# Patient Record
Sex: FEMALE | Race: WHITE | NOT HISPANIC OR LATINO | Employment: OTHER | URBAN - METROPOLITAN AREA
[De-identification: names, ages, dates, MRNs, and addresses within clinical notes are randomized per-mention and may not be internally consistent; named-entity substitution may affect disease eponyms.]

---

## 2017-04-21 DIAGNOSIS — I35.0 SEVERE AORTIC STENOSIS: Primary | ICD-10-CM

## 2017-05-18 ENCOUNTER — HOSPITAL ENCOUNTER (OUTPATIENT)
Dept: CARDIOLOGY | Facility: CLINIC | Age: 57
Discharge: HOME | End: 2017-05-18
Payer: MEDICARE

## 2017-05-18 ENCOUNTER — OFFICE VISIT (OUTPATIENT)
Dept: CARDIOTHORACIC SURGERY | Facility: CLINIC | Age: 57
End: 2017-05-18
Payer: MEDICARE

## 2017-05-18 ENCOUNTER — HOSPITAL ENCOUNTER (OUTPATIENT)
Dept: PULMONOLOGY | Facility: CLINIC | Age: 57
Discharge: HOME | End: 2017-05-18
Payer: MEDICARE

## 2017-05-18 VITALS
TEMPERATURE: 98 F | SYSTOLIC BLOOD PRESSURE: 86 MMHG | WEIGHT: 257 LBS | HEART RATE: 93 BPM | OXYGEN SATURATION: 100 % | DIASTOLIC BLOOD PRESSURE: 44 MMHG | BODY MASS INDEX: 42.82 KG/M2 | HEIGHT: 65 IN

## 2017-05-18 DIAGNOSIS — I35.0 SEVERE AORTIC STENOSIS: ICD-10-CM

## 2017-05-18 DIAGNOSIS — J44.9 CHRONIC OBSTRUCTIVE PULMONARY DISEASE, UNSPECIFIED COPD TYPE: Primary | ICD-10-CM

## 2017-05-18 DIAGNOSIS — I35.0 SEVERE AORTIC STENOSIS: Primary | ICD-10-CM

## 2017-05-18 LAB
AORTIC VALVE REGURGITATION: ABNORMAL
AORTIC VALVE STENOSIS: ABNORMAL
ESTIMATED PA SYSTOLIC PRESSURE: 80.93
PRE FEV1 FVC: 77
PRE FEV1: 1.2
PRE FVC: 1.56
PREDICTED FEV1 FVC: 80
PREDICTED FEV1: 2.6
PREDICTED FVC: 3.21
RETIRED EF AND QEF - SEE NOTES: 25 (ref 55–65)
TRICUSPID VALVE REGURGITATION: ABNORMAL

## 2017-05-18 PROCEDURE — 82803 BLOOD GASES ANY COMBINATION: CPT | Mod: S$GLB,,, | Performed by: INTERNAL MEDICINE

## 2017-05-18 PROCEDURE — 99499 UNLISTED E&M SERVICE: CPT | Mod: S$GLB,,, | Performed by: THORACIC SURGERY (CARDIOTHORACIC VASCULAR SURGERY)

## 2017-05-18 PROCEDURE — 94010 BREATHING CAPACITY TEST: CPT | Mod: S$GLB,,, | Performed by: INTERNAL MEDICINE

## 2017-05-18 PROCEDURE — 1160F RVW MEDS BY RX/DR IN RCRD: CPT | Mod: S$GLB,,, | Performed by: THORACIC SURGERY (CARDIOTHORACIC VASCULAR SURGERY)

## 2017-05-18 PROCEDURE — 3078F DIAST BP <80 MM HG: CPT | Mod: S$GLB,,, | Performed by: THORACIC SURGERY (CARDIOTHORACIC VASCULAR SURGERY)

## 2017-05-18 PROCEDURE — 36600 WITHDRAWAL OF ARTERIAL BLOOD: CPT | Mod: 59,S$GLB,, | Performed by: INTERNAL MEDICINE

## 2017-05-18 PROCEDURE — 93306 TTE W/DOPPLER COMPLETE: CPT | Mod: S$GLB,,, | Performed by: INTERNAL MEDICINE

## 2017-05-18 PROCEDURE — 99205 OFFICE O/P NEW HI 60 MIN: CPT | Mod: S$GLB,,, | Performed by: THORACIC SURGERY (CARDIOTHORACIC VASCULAR SURGERY)

## 2017-05-18 PROCEDURE — 99999 PR PBB SHADOW E&M-EST. PATIENT-LVL III: CPT | Mod: PBBFAC,,, | Performed by: THORACIC SURGERY (CARDIOTHORACIC VASCULAR SURGERY)

## 2017-05-18 PROCEDURE — 3074F SYST BP LT 130 MM HG: CPT | Mod: S$GLB,,, | Performed by: THORACIC SURGERY (CARDIOTHORACIC VASCULAR SURGERY)

## 2017-05-18 NOTE — PROGRESS NOTES
"Cardiothoracic Surgery Clinic Note - H and P    Subjective:     Procedures:      Meme aYng is a 56 y.o. female with history of AS, COPD (on home O2), HTN, HLD who presents to clinic for evaluation. She had aortic stenosis s/p prosthetic AVR in . She reports worsening dyspnea, new onset syncopal episodes -- which she states were similar to the "syncope she experienced prior to her first surgery in ." She reports she is no longer able to ambulate.      LHC in Oct '16 showed no significant CAD.     NYHA Class IV    Past Medical History:   Diagnosis Date    Asthma     COPD (chronic obstructive pulmonary disease)     Heart disease     Hypertension     Pneumonia        Past Surgical History:   Procedure Laterality Date    AORTIC VALVE REPLACEMENT  2012    Porcine  [facility]g     SECTION      CHOLECYSTECTOMY      HERNIA REPAIR      HYSTERECTOMY      implanted cardiac monitor  2013    medtronic       Social History     Social History    Marital status:      Spouse name: N/A    Number of children: N/A    Years of education: N/A     Occupational History    Not on file.     Social History Main Topics    Smoking status: Former Smoker     Quit date: 8/15/2013    Smokeless tobacco: Not on file    Alcohol use Not on file    Drug use: Not on file    Sexual activity: Not on file     Other Topics Concern    Not on file     Social History Narrative       Review of patient's allergies indicates:   Allergen Reactions    Codeine Itching    Levaquin [levofloxacin]      "I'm not sure. It happened when I was young."      Zithromax [azithromycin] Nausea Only and Other (See Comments)     ha         Objective:     PHYSICAL EXAM:  Vital Signs (Most Recent)  Temp: 98 °F (36.7 °C) (17 1346)  Pulse: 93 (17 1346)  BP: (!) 86/44 (17 1346)  SpO2: 100 % (17 1346)    ROS 10 point ROS otherwise negative except as listed in HPI    Physical Exam:  In wheelchair, " deconditioned, on 2 L NC  Pale  Lungs with rales throughout  Harsh crescendo-decresendo murmur in the Right sternal border radiating to her carotids  Abdomen soft, obese  2+ edema in LE    Pathology- reviewed  Specimens     None        MARANDA from OSH -- severe AS, valve <1 cm, mean gradient 78 mm Hg    Assessment:     56 y.o. female with s/p AVR with severe symptomatic (syncope, dyspnea, angina) aortic stenosis    Plan:     - Too high risk for surgery STS >10%  - Will need op notes to see valve size, brand, etc  - Will set up for evaluation for TAVR    Iglesia Cortez MD   [facility]                  CTS Attending Note:    I have personally taken the history and examined this patient and agree with the resident's note as stated above. 55 yo morbidly obese F, in wheelchair with seatbelt in use, minimal ambulation for several months, recurrent admissions for CHF, and home O2 dependant COPD with prosthetic aortic valve stenosis. She is high risk for surgery. Recommend JOHANNA. According to the op note, she has a 21 mm Trifecta valve.

## 2017-05-18 NOTE — MR AVS SNAPSHOT
[facility]  [address]  [facility] [address]  Phone: [phone]                  Meme Yang   2017 1:45 PM   Appointment    Description:  Female : 1960   Provider:  Camron Gomez MD   Department:  [facility]                To Do List           Future Appointments        Provider Department Dept Phone    2017 1:00 PM PULMONARY FUNCTION [facility] - Pulmonary Lab [phone]    2017 1:45 PM Camron Gomez MD [facility] [phone]    2017 2:30 PM ECHO, [facility] - Echo/Stress Lab [phone]      Goals (5 Years of Data)     None      [facility]     [facility] Care [facility] -  Assistance  Unless otherwise directed by your provider, please contact [facility], our nurse care line that is available for  assistance.     Registered nurses in the [facility] provide: appointment scheduling, clinical advisement, health education, and other advisory services.  Call: [phone] (toll free)               Medications           Message regarding Medications     Verify the changes and/or additions to your medication regime listed below are the same as discussed with your clinician today.  If any of these changes or additions are incorrect, please notify your healthcare provider.             Verify that the below list of medications is an accurate representation of the medications you are currently taking.  If none reported, the list may be blank. If incorrect, please contact your healthcare provider. Carry this list with you in case of emergency.           Current Medications     ALBUTEROL SULFATE (PROAIR HFA INHL) Inhale 1 puff into the lungs once as needed.    ALBUTEROL SULFATE (PROVENTIL HFA INHL) Inhale 2.5 mg into the lungs as needed.    albuterol-ipratropium 2.5mg-0.5mg/3mL (DUO-NEB) 0.5 mg-3 mg(2.5 mg base)/3 mL nebulizer solution Take 3 mLs by  nebulization every 6 (six) hours as needed for Wheezing.    aspirin (ECOTRIN) 325 MG EC tablet Take 325 mg by mouth once daily.    fluticasone-salmeterol 500-50 mcg/dose (ADVAIR DISKUS) 500-50 mcg/dose DsDv diskus inhaler Inhale 1 puff into the lungs 2 (two) times daily.    furosemide (LASIX) 20 MG tablet Take 20 mg by mouth 2 (two) times daily.    meclizine (ANTIVERT) 12.5 mg tablet Take 12.5 mg by mouth 2 (two) times daily as needed.    PNV cmb#21-iron-folic acid  mg-mcg Tab Take 1 tablet by mouth once daily.    potassium chloride (MICRO-K) 10 MEQ CpSR Take 20 mEq by mouth 2 (two) times daily.           Clinical Reference Information           Allergies as of 5/18/2017     Codeine    Levaquin [Levofloxacin]    Zithromax [Azithromycin]      Immunizations Administered on Date of Encounter - 5/18/2017     None      [facility] Sign-Up     Activating your [facility] account is as easy as 1-2-3!     1) Visit [email or web address], select Sign Up Now, enter this activation code and your date of birth, then select Next.  [email or web address]  Expires: 6/22/2017 12:45 PM      2) Create a username and password to use when you visit [facility] in the future and select a security question in case you lose your password and select Next.    3) Enter your e-mail address and click Sign Up!    Additional Information  If you have questions, please e-mail [email or web address] or call [phone] to talk to our [facility] staff. Remember, [facility] is NOT to be used for urgent needs. For medical emergencies, dial 911.         Language Assistance Services     ATTENTION: Language assistance services are available, free of charge. Please call [phone].      ATENCIÓN: Si habla español, tiene a solis disposición servicios gratuitos de asistencia lingüística. Llame al [phone].     CHÚ Ý: N?u b?n nói Ti?ng Vi?t, có các d?ch v? h? tr? ngôn ng? mi?n phí dành cho b?n. G?i s? [phone].         [facility] complies  with applicable Federal civil rights laws and does not discriminate on the basis of race, color, national origin, age, disability, or sex.

## 2017-05-19 PROBLEM — E66.01 MORBID OBESITY WITH BMI OF 40.0-44.9, ADULT: Status: ACTIVE | Noted: 2017-05-19

## 2017-05-19 NOTE — PROGRESS NOTES
Patient, Meme Yang (MRN #[id number]), presented with a recorded BMI of 42.77 kg/m^2 consistent with the definition of morbid obesity (ICD-10 E66.01). The patient's morbid obesity was monitored, evaluated, addressed and/or treated. This addendum to the medical record is made on 05/19/2017.

## 2017-06-22 ENCOUNTER — TELEPHONE (OUTPATIENT)
Dept: CARDIOLOGY | Facility: CLINIC | Age: 57
End: 2017-06-22

## 2017-06-22 NOTE — TELEPHONE ENCOUNTER
Attempted to contact patient regarding TAVR referral.  Previously spoke to sister and informed her that we needed to get a copy of her angiogram film from 10/16.  Left message on VM.

## 2017-07-03 ENCOUNTER — TELEPHONE (OUTPATIENT)
Dept: CARDIOLOGY | Facility: CLINIC | Age: 57
End: 2017-07-03

## 2017-07-03 NOTE — TELEPHONE ENCOUNTER
Attempted to contact patient again regarding TAVR referral.  Left message with office number and reminder that we need to have copy of [facility] films sent to Dr Patino before scheduling appointments.